# Patient Record
Sex: FEMALE | Race: BLACK OR AFRICAN AMERICAN | NOT HISPANIC OR LATINO | Employment: FULL TIME | URBAN - METROPOLITAN AREA
[De-identification: names, ages, dates, MRNs, and addresses within clinical notes are randomized per-mention and may not be internally consistent; named-entity substitution may affect disease eponyms.]

---

## 2022-08-07 ENCOUNTER — HOSPITAL ENCOUNTER (EMERGENCY)
Facility: MEDICAL CENTER | Age: 32
End: 2022-08-07
Attending: EMERGENCY MEDICINE
Payer: MEDICAID

## 2022-08-07 VITALS
WEIGHT: 171.74 LBS | DIASTOLIC BLOOD PRESSURE: 84 MMHG | HEART RATE: 88 BPM | HEIGHT: 61 IN | RESPIRATION RATE: 16 BRPM | BODY MASS INDEX: 32.42 KG/M2 | TEMPERATURE: 98.4 F | SYSTOLIC BLOOD PRESSURE: 127 MMHG | OXYGEN SATURATION: 99 %

## 2022-08-07 DIAGNOSIS — M62.838 MUSCLE SPASM: Primary | ICD-10-CM

## 2022-08-07 DIAGNOSIS — N39.0 ACUTE UTI: ICD-10-CM

## 2022-08-07 LAB
APPEARANCE UR: ABNORMAL
BACTERIA #/AREA URNS HPF: ABNORMAL /HPF
BILIRUB UR QL STRIP.AUTO: NEGATIVE
COLOR UR: YELLOW
EPI CELLS #/AREA URNS HPF: ABNORMAL /HPF
GLUCOSE UR STRIP.AUTO-MCNC: NEGATIVE MG/DL
HCG UR QL: NEGATIVE
KETONES UR STRIP.AUTO-MCNC: NEGATIVE MG/DL
LEUKOCYTE ESTERASE UR QL STRIP.AUTO: ABNORMAL
MICRO URNS: ABNORMAL
NITRITE UR QL STRIP.AUTO: NEGATIVE
PH UR STRIP.AUTO: 6 [PH] (ref 5–8)
PROT UR QL STRIP: NEGATIVE MG/DL
RBC # URNS HPF: ABNORMAL /HPF
RBC UR QL AUTO: ABNORMAL
SP GR UR STRIP.AUTO: <=1.005
WBC #/AREA URNS HPF: ABNORMAL /HPF

## 2022-08-07 PROCEDURE — A9270 NON-COVERED ITEM OR SERVICE: HCPCS | Performed by: EMERGENCY MEDICINE

## 2022-08-07 PROCEDURE — 87186 SC STD MICRODIL/AGAR DIL: CPT

## 2022-08-07 PROCEDURE — 81025 URINE PREGNANCY TEST: CPT

## 2022-08-07 PROCEDURE — 87086 URINE CULTURE/COLONY COUNT: CPT

## 2022-08-07 PROCEDURE — 700102 HCHG RX REV CODE 250 W/ 637 OVERRIDE(OP): Performed by: EMERGENCY MEDICINE

## 2022-08-07 PROCEDURE — 81001 URINALYSIS AUTO W/SCOPE: CPT

## 2022-08-07 PROCEDURE — 87077 CULTURE AEROBIC IDENTIFY: CPT

## 2022-08-07 PROCEDURE — 99284 EMERGENCY DEPT VISIT MOD MDM: CPT

## 2022-08-07 RX ORDER — ACETAMINOPHEN 500 MG
1000 TABLET ORAL ONCE
Status: COMPLETED | OUTPATIENT
Start: 2022-08-07 | End: 2022-08-07

## 2022-08-07 RX ORDER — IBUPROFEN 800 MG/1
800 TABLET ORAL EVERY 8 HOURS PRN
Qty: 20 TABLET | Refills: 0 | Status: SHIPPED | OUTPATIENT
Start: 2022-08-07 | End: 2022-08-10

## 2022-08-07 RX ORDER — SULFAMETHOXAZOLE AND TRIMETHOPRIM 800; 160 MG/1; MG/1
1 TABLET ORAL ONCE
Status: COMPLETED | OUTPATIENT
Start: 2022-08-07 | End: 2022-08-07

## 2022-08-07 RX ORDER — SULFAMETHOXAZOLE AND TRIMETHOPRIM 800; 160 MG/1; MG/1
1 TABLET ORAL 2 TIMES DAILY
Qty: 10 TABLET | Refills: 0 | Status: SHIPPED | OUTPATIENT
Start: 2022-08-07 | End: 2022-08-12

## 2022-08-07 RX ORDER — ACETAMINOPHEN 500 MG
1000 TABLET ORAL EVERY 6 HOURS PRN
Qty: 20 TABLET | Refills: 0 | Status: SHIPPED | OUTPATIENT
Start: 2022-08-07 | End: 2022-08-11

## 2022-08-07 RX ORDER — CYCLOBENZAPRINE HCL 10 MG
10 TABLET ORAL 3 TIMES DAILY PRN
Qty: 10 TABLET | Refills: 0 | Status: SHIPPED | OUTPATIENT
Start: 2022-08-07 | End: 2022-08-10

## 2022-08-07 RX ADMIN — ACETAMINOPHEN 1000 MG: 500 TABLET ORAL at 03:06

## 2022-08-07 RX ADMIN — SULFAMETHOXAZOLE AND TRIMETHOPRIM 1 TABLET: 800; 160 TABLET ORAL at 03:20

## 2022-08-07 RX ADMIN — IBUPROFEN 800 MG: 600 TABLET ORAL at 03:20

## 2022-08-07 NOTE — DISCHARGE INSTRUCTIONS
Your urine shows infection.  You are started on Bactrim tonight and I have sent a prescription of Bactrim to your pharmacy.  Also sent medications including Tylenol, Motrin and Flexeril which is a muscle relaxer.  You also have a left lower back muscle spasm.  I want you to practice stretching exercises for this.  Follow-up with your PCP for further evaluation and treatment.  If you develop worsening symptoms, please return the ED for further evaluation.  Thank you for coming in today.

## 2022-08-07 NOTE — ED NOTES
Pt chief complaint: Lower back pain.     Pt states that she might have a UTI or a kidney infection.

## 2022-08-07 NOTE — ED TRIAGE NOTES
"Patient arrived to the ER with the following complaints:      Chief Complaint   Patient presents with   • Abdominal Pain     Lower abd pain that is increased with palpation. Burning sensation.    • Painful Urination     Burning sensation when urinating. Patient believes she may also have blood in her urine from earlier today.    • Flank Pain     Left sided flank pain that is increased with laying down.        BP (!) 127/34   Pulse 88   Temp 36.3 °C (97.4 °F) (Temporal)   Resp 18   Ht 1.549 m (5' 1\")   Wt 77.9 kg (171 lb 11.8 oz)   LMP 07/10/2022 (Approximate)   SpO2 100%   BMI 32.45 kg/m²       "

## 2022-08-07 NOTE — ED PROVIDER NOTES
ED Provider Note  ED Provider Note    Scribed for Andrea Kinney by Andrea Kinney. 8/7/2022  2:55 AM    Primary care provider: No primary care provider on file.  Means of arrival: private vehicle   History obtained from: Patient  History limited by: None    CHIEF COMPLAINT  Chief Complaint   Patient presents with   • Abdominal Pain     Lower abd pain that is increased with palpation. Burning sensation.    • Painful Urination     Burning sensation when urinating. Patient believes she may also have blood in her urine from earlier today.    • Flank Pain     Left sided flank pain that is increased with laying down.      HPI  Juan Mcdaniel is a 32 y.o. female who presents to the Emergency Department with a past medical history of UTIs recurrently, presenting with dysuria and back pain.  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  Dysuria started about 12 hours ago, the back pain started approximately 4 hours ago.  She took 2 Motrin yesterday to help with the pain.  Feels similar to UTIs she has had previously.  Denies alcohol, drug or tobacco abuse.  She is otherwise fairly healthy.  Denies any fevers, nausea, vomiting, diarrhea, constipation.  She is undergone remote tubal ligation.  She is sexually active with only her  and has no concerns for STDs.  Denies any vaginal discharge or discomfort.  Quality: Burning with urination  Duration: 12 hours  Severity: Moderate  Associated sx: Back pain    REVIEW OF SYSTEMS  As above, all other systems reviewed and are negative.   See HPI for further details.     PAST MEDICAL HISTORY     SURGICAL HISTORY  patient denies any surgical history  SOCIAL HISTORY  Social History     Tobacco Use   • Smoking status: Current Every Day Smoker     Packs/day: 0.25     Types: Cigarettes     Start date: 8/1/2012   • Smokeless tobacco: Never Used   Vaping Use   • Vaping Use: Never used   Substance Use Topics   • Alcohol use: Yes      "Alcohol/week: 0.6 oz     Types: 1 Shots of liquor per week   • Drug use: Yes     Types: Inhaled     Comment: Majijuanna occasionally.      Social History     Substance and Sexual Activity   Drug Use Yes   • Types: Inhaled    Comment: Majijuanna occasionally.     FAMILY HISTORY  History reviewed. No pertinent family history.  CURRENT MEDICATIONS  Home Medications     Reviewed by Javier Martino R.N. (Registered Nurse) on 08/07/22 at 0247  Med List Status: Not Addressed   Medication Last Dose Status        Patient Riley Taking any Medications                     ALLERGIES  Not on File  PHYSICAL EXAM  VITAL SIGNS:   Vitals:    08/07/22 0229   BP: (!) 127/34   Pulse: 88   Resp: 18   Temp: 36.3 °C (97.4 °F)   TempSrc: Temporal   SpO2: 100%   Weight: 77.9 kg (171 lb 11.8 oz)   Height: 1.549 m (5' 1\")       Vitals: My interpretation: normotensive, not tachycardic, afebrile, not hypoxic    Reinterpretation of vitals: Unchanged    PE:   Gen: sitting comfortably, speaking clearly, appears in no acute distress   ENT: Mucous membranes moist, posterior pharynx clear, uvula midline, nares patent bilaterally   Neck: Supple, FROM  Pulmonary: Lungs are clear to auscultation bilaterally. No tachypnea  CV:  RRR, no murmur appreciated, pulses 2+ in both upper and lower extremities  Abdomen: soft, NT/ND; no rebound/guarding  : mild CVA ttp, no suprapubic tenderness   Neuro: A&Ox4 (person, place, time, situation), speech fluent, gait steady, no focal deficits appreciated  Skin: No rash or lesions.  No pallor or jaundice.  No cyanosis.  Warm and dry.   Back: Has moderate tenderness palpation of the lumbar paraspinal muscles on the left-hand side which when palpated reproduce her back pain and are consistent with a muscle spasm.  No midline tenderness, trauma, deformity, step-offs or bruising.    DIAGNOSTIC STUDIES / PROCEDURES    LABS  Results for orders placed or performed during the hospital encounter of 08/07/22   URINALYSIS " CULTURE, IF INDICATED    Specimen: Urine   Result Value Ref Range    Color Yellow     Character Hazy (A)     Specific Gravity <=1.005 <1.035    Ph 6.0 5.0 - 8.0    Glucose Negative Negative mg/dL    Ketones Negative Negative mg/dL    Protein Negative Negative mg/dL    Bilirubin Negative Negative    Nitrite Negative Negative    Leukocyte Esterase Large (A) Negative    Occult Blood Large (A) Negative    Micro Urine Req Microscopic    HCG QUALITATIVE UR (Lab)   Result Value Ref Range    Beta-Hcg Urine Negative Negative   URINE MICROSCOPIC (W/UA)   Result Value Ref Range    WBC 20-50 (A) /hpf    RBC 10-20 (A) /hpf    Bacteria Many (A) None /hpf    Epithelial Cells Few Few /hpf   URINE CULTURE(NEW)    Specimen: Urine   Result Value Ref Range    Significant Indicator NEG     Source UR     Site -     Culture Result -       All labs reviewed by me. Significant for negative pregnancy test, urinalysis demonstrating positive urinary tract infection    RADIOLOGY  No orders to display     The radiologist's interpretation of all radiological studies have been reviewed by me.    COURSE & MEDICAL DECISION MAKING  Nursing notes, VS, PMSFHx, labs, imaging, EKG reviewed in chart.    MDM: 2:55 AM Juan Mcdaniel is a 32 y.o. female who presented with concerns for recurrent UTI.  She has dysuria, increased urinary frequency, denies hematuria.  No vaginal symptoms.  Also has left lumbar paraspinal muscle tenderness which is very tender to palpation and consistent likely with muscle spasm.  She is afebrile, is nontoxic-appearing, normal vital signs.  Physical exam is otherwise benign and she has no flank or abdominal tenderness.  Her urinalysis demonstrates infection and she started on Bactrim.  Her pregnancy test was negative.  She will be given prescriptions for Bactrim, Tylenol, Motrin as well as Flexeril for muscle relaxation for likely back spasm.  She will follow-up with PCP in the next few days for recheck as needed.  I  discussed strict return precautions and patient verbalized understanding plan.  At this time patient's presentation is not consistent with a kidney stone as her pain is reproducible in the lower back and she has signs of a urinary tract infection consistent with prior episodes of urinary tract infections.  She is ambulatory, well-appearing time discharge and verbalized understanding strict return precautions.    FINAL IMPRESSION  1. Muscle spasm Acute   2. Acute UTI Acute        The note accurately reflects work and decisions made by me.  Andrea Kinney  8/7/2022  2:55 AM

## 2022-08-09 LAB
BACTERIA UR CULT: ABNORMAL
BACTERIA UR CULT: ABNORMAL
SIGNIFICANT IND 70042: ABNORMAL
SITE SITE: ABNORMAL
SOURCE SOURCE: ABNORMAL

## 2022-08-16 NOTE — ED NOTES
"ED Positive Culture Follow-up/Notification Note:    Date: 8/15/22     Patient seen in the ED on 8/7/2022 for complaints of dysuria and back pain. Patient has a history of UTIs. Patient denies vomiting, nausea, diarrhea, and hematuria.  Patient is afebrile with mild CVA tenderness. Physical exam suggests back pain is due to muscle spasms.  Urine culture is taken and patient is discharged on 5 days of Bactrim.  1. Muscle spasm Acute   2. Acute UTI Acute      Discharge Medication List as of 8/7/2022  3:27 AM        START taking these medications    Details   acetaminophen (TYLENOL) 500 MG Tab Take 2 Tablets by mouth every 6 hours as needed for Moderate Pain for up to 4 days., Disp-20 Tablet, R-0, Print Rx Paper      ibuprofen (MOTRIN) 800 MG Tab Take 1 Tablet by mouth every 8 hours as needed for Mild Pain for up to 3 days., Disp-20 Tablet, R-0, Print Rx Paper      cyclobenzaprine (FLEXERIL) 10 mg Tab Take 1 Tablet by mouth 3 times a day as needed for Muscle Spasms for up to 3 days., Disp-10 Tablet, R-0, Print Rx Paper      sulfamethoxazole-trimethoprim (BACTRIM DS) 800-160 MG tablet Take 1 Tablet by mouth 2 times a day for 5 days., Disp-10 Tablet, R-0, Print Rx Paper             Allergies: Patient has no allergy information on record.     Vitals:    08/07/22 0229 08/07/22 0326   BP: (!) 127/34 127/84   Pulse: 88 88   Resp: 18 16   Temp: 36.3 °C (97.4 °F) 36.9 °C (98.4 °F)   TempSrc: Temporal Oral   SpO2: 100% 99%   Weight: 77.9 kg (171 lb 11.8 oz)    Height: 1.549 m (5' 1\")        Final cultures:   Results       Procedure Component Value Units Date/Time    URINE CULTURE(NEW) [[id number]]  (Abnormal)  (Susceptibility) Collected: 08/07/22 0242    Order Status: Completed Specimen: Urine Updated: 08/09/22 1049     Significant Indicator POS     Source UR     Site -     Culture Result Usual skin saroj 10-50,000 cfu/mL      Escherichia coli  >100,000 cfu/mL      Narrative:      Indication for culture:->Patient WITHOUT an " indwelling Ahmadi  catheter in place with new onset of Dysuria, Frequency,  Urgency, and/or Suprapubic pain    Susceptibility       Escherichia coli (1)       Antibiotic Interpretation Microscan   Method Status      Amikacin  [*]  Sensitive <=16 mcg/mL MAEVE Final     Ampicillin Sensitive <=8 mcg/mL MAEVE Final     Amoxicillin/CA  [*]  Sensitive <=8/4 mcg/mL MAEVE Final     Aztreonam  [*]  Sensitive <=4 mcg/mL MAEVE Final     Ceftolozane+Tazobactam  [*]  Sensitive <=2 mcg/mL MAEVE Final     Ceftriaxone Sensitive <=1 mcg/mL MAEVE Final     Ceftazidime  [*]  Sensitive <=1 mcg/mL MAEVE Final     Cefazolin Sensitive <=2 mcg/mL MAEVE Final      Breakpoints when Cefazolin is used for therapy of infections  other than uncomplicated UTIs due to Enterobacterales are as  follows:  MAEVE and Interpretation:  <=2 S  4 I  >=8 R          Ciprofloxacin Sensitive <=0.25 mcg/mL MAEVE Final     Cefepime Sensitive <=2 mcg/mL MAEVE Final     Cefuroxime Sensitive <=4 mcg/mL MAEVE Final     Ceftazidime+Avibactam  [*]  Sensitive <=4 mcg/mL MAEVE Final     Ampicillin/sulbactam Sensitive <=4/2 mcg/mL MAEVE Final     Ertapenem  [*]  Sensitive <=0.5 mcg/mL MAEVE Final     Tobramycin Sensitive <=2 mcg/mL MAEVE Final     Nitrofurantoin Sensitive <=32 mcg/mL MAEVE Final     Gentamicin Sensitive <=2 mcg/mL MAEVE Final     Imipenem  [*]  Sensitive <=1 mcg/mL MAEVE Final     Levofloxacin Sensitive <=0.5 mcg/mL MAEVE Final     Meropenem  [*]  Sensitive <=1 mcg/mL MAEVE Final     Meropenem/Vaborbactam  [*]  Sensitive <=2 mcg/mL MAEVE Final     Minocycline Sensitive <=4 mcg/mL MAEVE Final     Pip/Tazobactam Sensitive <=8 mcg/mL MAEVE Final     Trimeth/Sulfa Sensitive <=0.5/9.5 mcg/mL MAEVE Final     Tetracycline  [*]  Sensitive <=4 mcg/mL MAEVE Final     Tigecycline Sensitive <=2 mcg/mL MAEVE Final              [*]  Suppressed Antibiotic                           Plan:   Urine culture grew pan-susceptible E coli. Appropriate antibiotic therapy prescribed. No changes required based upon culture  result.    Lenny CobianD

## 2022-09-19 ENCOUNTER — TELEPHONE (OUTPATIENT)
Dept: SCHEDULING | Facility: IMAGING CENTER | Age: 32
End: 2022-09-19
Payer: COMMERCIAL